# Patient Record
Sex: MALE | Race: WHITE | URBAN - METROPOLITAN AREA
[De-identification: names, ages, dates, MRNs, and addresses within clinical notes are randomized per-mention and may not be internally consistent; named-entity substitution may affect disease eponyms.]

---

## 2020-11-16 ENCOUNTER — OFFICE VISIT (OUTPATIENT)
Dept: ORTHOPEDIC SURGERY | Age: 45
End: 2020-11-16
Payer: COMMERCIAL

## 2020-11-16 VITALS — WEIGHT: 225 LBS | HEIGHT: 73 IN | BODY MASS INDEX: 29.82 KG/M2 | RESPIRATION RATE: 15 BRPM

## 2020-11-16 PROBLEM — M25.562 LEFT KNEE PAIN: Status: ACTIVE | Noted: 2020-11-16

## 2020-11-16 PROBLEM — S83.242A ACUTE MEDIAL MENISCUS TEAR OF LEFT KNEE: Status: ACTIVE | Noted: 2020-11-16

## 2020-11-16 PROCEDURE — 99202 OFFICE O/P NEW SF 15 MIN: CPT | Performed by: PHYSICIAN ASSISTANT

## 2020-11-16 RX ORDER — ATORVASTATIN CALCIUM 20 MG/1
20 TABLET, FILM COATED ORAL DAILY
COMMUNITY

## 2020-11-16 RX ORDER — LISINOPRIL 20 MG/1
20 TABLET ORAL DAILY
COMMUNITY

## 2020-11-16 RX ORDER — CYCLOBENZAPRINE HCL 10 MG
10 TABLET ORAL 3 TIMES DAILY PRN
COMMUNITY

## 2020-11-16 NOTE — PROGRESS NOTES
Subjective:      Patient ID: Bunny Basurto is a 54 y.o.  male. Chief Complaint   Patient presents with    Knee Pain     Left        HPI:   He is here for an initial evaluation of left medial/ posterior knee pain. Onset of symptoms 2 days ago. These symptoms have not been progressive in nature. There is  a history of injury. Stood up and twisted on his left knee and felt a pop. Pain is intermittent, moderate. Location of pain medial knee. Pain is on average 6/10. Pain is worse with bending or twisting the knee. Pain improves with rest, elevation and ice. There is not associated numbness/ tingling. Previous treatments have included: ice with mild relief or improvement. Review of Systems:   A full list of the ROS have been reviewed. These are recorded and signed in the chart. Past Medical History:   Diagnosis Date    Fractures     Kidney stones        History reviewed. No pertinent family history. Past Surgical History:   Procedure Laterality Date    TONSILLECTOMY Bilateral        Social History     Occupational History    Not on file   Tobacco Use    Smoking status: Current Some Day Smoker    Smokeless tobacco: Never Used   Substance and Sexual Activity    Alcohol use: Yes     Alcohol/week: 5.0 standard drinks     Types: 5 Cans of beer per week    Drug use: Yes     Types: Marijuana    Sexual activity: Not on file       Current Outpatient Medications   Medication Sig Dispense Refill    lisinopril (PRINIVIL;ZESTRIL) 20 MG tablet Take 20 mg by mouth daily      atorvastatin (LIPITOR) 20 MG tablet Take 20 mg by mouth daily      cyclobenzaprine (FLEXERIL) 10 MG tablet Take 10 mg by mouth 3 times daily as needed for Muscle spasms       No current facility-administered medications for this visit. Objective:   He is alert, oriented x 3, pleasant, well nourished, developed and in no acute distress.     Resp 15   Ht 6' 1\" (1.854 m)   Wt 225 lb (102.1 kg)   BMI 29.69 kg/m²      KNEE EXAM:  Examination of the left  knee shows: The alignment of the knee is neutral.   There is not erythema. There is no soft tissue swelling. There is mild effusion. ROM-  Extension 0          -   Flexion  120  There is moderate pain associated with ROM testing. Medial joint line is moderately tender to palpation. Lateral joint line mildly tender to palpation. Retro patellar crepitus is not present. There is mild crepitus along the joint line with ROM testing. Varus Stress testing does not produce pain,                                     does not show laxity. Valgus Stress testing does not produce pain,                                       does not show laxity. Lachman's test is negative. Anterior Drawer test is Negative. Posterior Drawer test is Negative  Marlys's Test is Positive. Patellar Compression testing does not produce pain. Extensor Mechanism is  intact. NEUROLOGICAL EXAM:  Examination of the lower extremities are intact with sensation to light touch. Motor testing  5/5 in all major motor groups including hip abductors, hip adductors,  quadriceps, hamstring, dorsi flexors and EHL testing. Gait is antalgic. Normal heel to toe. Negative Bernal's Sign. SLR negative. VASCULAR EXAM:  Examination of the lower extremities shows intact perfusion to all extremities. No cyanosis. Digits are warm to touch, capillary refill is less than 2 seconds. no edema noted. SKIN:  Examination of the lower extremity skin reveals: The skin to be intact without lacerations or abrasions. No significant erythema. No rashes or skin lesions. X Rays: performed in the office today:   AP, PA Standing, Lateral and Sunrise views of left knee:  No acute fractures or dislocations noted. There is no patellar subluxation. Bipartite patella noted. Mild degenerative changes of the patellofemoral compartment. Assessment:       ICD-10-CM    1.  Left knee pain, unspecified chronicity  M25.562 XR KNEE LEFT (MIN 4 VIEWS)   2. Acute medial meniscus tear of left knee, initial encounter  P48.242L         Plan:     The natural history of the patient's diagnosis as well as the treatment options were discussed in full and questions were answered. Risks and benefits of the treatment options also reviewed in detail. Rest, Ice, Compression and Elevation  OTC NSAID'S discussed to be taken in appropriate  therapeutic doses. Activity restriction/ Modification discussed. The patient was advised that NSAID-type medications have two very important potential side effects: gastrointestinal irritation including hemorrhage and renal injuries. He was asked to take the medication with food and to stop if he experiences any GI upset. I asked him to call for vomiting, abdominal pain or black/bloody stools. He should have renal function testing per his medical provider periodically. The patient expresses understanding of these issues and questions were answered. The need for a MRI of the Left Knee was discussed. MRI will be obtained. Follow Up: 1 week with Dr Key Mabry. Call or return to clinic prn if these symptoms worsen or fail to improve as anticipated.